# Patient Record
Sex: FEMALE | Race: WHITE | NOT HISPANIC OR LATINO | Employment: UNEMPLOYED | URBAN - METROPOLITAN AREA
[De-identification: names, ages, dates, MRNs, and addresses within clinical notes are randomized per-mention and may not be internally consistent; named-entity substitution may affect disease eponyms.]

---

## 2018-04-12 PROBLEM — R94.30 ABNORMAL CARDIAC FUNCTION TEST: Status: ACTIVE | Noted: 2018-04-12

## 2021-04-16 ENCOUNTER — PATIENT MESSAGE (OUTPATIENT)
Dept: RESEARCH | Facility: HOSPITAL | Age: 61
End: 2021-04-16

## 2021-10-26 ENCOUNTER — TELEPHONE (OUTPATIENT)
Dept: SURGERY | Facility: CLINIC | Age: 61
End: 2021-10-26
Payer: MEDICAID

## 2022-09-12 ENCOUNTER — TELEPHONE (OUTPATIENT)
Dept: SURGERY | Facility: CLINIC | Age: 62
End: 2022-09-12
Payer: MEDICAID

## 2022-09-12 DIAGNOSIS — Z12.11 SCREENING FOR COLON CANCER: Primary | ICD-10-CM

## 2022-09-12 RX ORDER — SODIUM CHLORIDE 0.9 % (FLUSH) 0.9 %
3 SYRINGE (ML) INJECTION
Status: CANCELLED | OUTPATIENT
Start: 2022-09-12

## 2022-09-12 NOTE — TELEPHONE ENCOUNTER
Called patient in reference to a referral to Colorectal Surgery for colon cancer screening. Patient verbally consented to a Colonoscopy and requested to be scheduled for a Colonoscopy on 11/14/2022. Patient was advised a designated  is required on the day of the Colonoscopy to drive the patient home and the  must be at least. 18 years old.Colonoscopy Prep instructions were thoroughly explained and discussed with the patient.   It was emphasized, and reiterated to the patient, not to follow the prep instructions that comes with the Prep Kit. However, to please follow the prep instructions that will be received in the mail from the [facility]   Patient acknowledges understanding Prep instructions as explained and discussed on the phone.. Patient was advised the Colonoscopy Prep instructions discussed and explained on the phone,are being mailed out to the patient's verified address on file. Patient's address on file was verified with the patient for accuracy of mailing. Patient's medications on file was reviewed with the patient for accuracy of information. Patient denies taking  any other medications other than those listed and verified on medication profile.Additionally , this patient denies to still be taking Plavix 75 MG PO daily.Patient was explained the Colonoscopy will be performed here at [facility]. Patient was further explained the Pre-Op will call one day prior to the procedure date, to discuss Pre-Op instructions;and what time to report for the Colonoscopy. The patient was given the opportunity to ask any questions about the Colonoscopy. No further issues were discussed.         Bowel Prep/SUPREP instructions                                               [facility]    [address] [place] [address]      You are scheduled for a Colonoscopy with _______________________ on ____________________   At [facility] in [place].    Check  in at the hospital on 1st floor Registration area next to Emergency room.    Please call [phone] to reschedule or if you have any questions.    An adult friend/family member must come with you to drive you home.  You cannot drive, take a taxi, Uber/Lyft or bus to leave the Hospital alone. If you do not have someone with you to drive you home, your test will be cancelled.       Please follow the directions of your doctor if you take any pills that thin your blood.  If you take these meds: Aggrenox, Brilinta, Effient, Eliquis, Lovenox, Plavix, Pletal Pradaxa ticilid, Xarelto, or Coumadin, let the doctor's office know.    Don't: On the morning of the test do not take insulin or pills for diabetes.   Do: On the morning of the test, do take any pills for blood pressure, heart, anti-rejection and or seizures with a small sip of water. Bring any inhalers with you day of procedure.    To have a good prep, you must follow these instructions- please do not use the directions from the pharmacy!      The doctor will send a prescription for the SUPREP   The day Before the test:   You can only drink CLEAR LIQUIDS the whole day before your test. You can't eat any food for the whole day.    You CAN have:   Water,Coffee or decaf coffee (no milk or cream)    Tea   Soft drinks- regular and sugar free   Jell-O (green or yellow)   Apple Juice, grape juice, white cranberry juice   Gatorade, Power Aid, Crystal Light, Zhou Aid   Lemonade and Limeade   Bouillon, clear soup   Snowball, popsicles   YOU CAN'T DRINK ANYTHING RED   YOU CAN'T DRINK ALCOHOL   ONLY DRINK WHAT IS ON THIS List      At 5pm the night before your test:   Pour the 1st bottle of SUPREP into the cup provided in the box.  Add water to the line on the cup and mix well. Drink the whole cup and then drink 2 more full cups of water over the 1 hour.    This can be easier to drink if it is cold.  You can mix it 20 minutes ahead of time and place in the refrigerator before  you drink it.  You must drink it within 30-45 minutes of mixing it. Do NOT pour the drink over ice. You can drink it with a straw.    The Day of the test- We will call you 1 day before your test to tell you what time to get there.    5 hours before you come to the hospital (this may be the middle of the night)   Pour the 2nd bottle of SUPREP into to the cup provided in the box. Add water to the line on the cup and mix well. Drink the whole cup and then drink 2 more full cups of water over 1 hour.    It might be easier to drink if it is cold. You can mix it 20 minutes ahead of time and place in the refrigerator before you drink it. You must drink it within 30-45 minutes of mixing it. Do NOT pour the drink over ice. You can drink it with a straw.   YOU CAN'T EAT OR DRINK ANYTHING ELSE ONCE YOU FINISH THE PREP.   Leave all valuables and jewelry at home. You will be at the hospital for 2-4 hours.    Call the Endoscopy Scheduling Department at [phone] with any questions about your procedure.    Please  your medication from your local pharmacy. If you are unable to  the SUPREP Kit please contact our office.   Thank you   Endo Scheduling Dept   [facility]

## 2022-09-13 RX ORDER — SODIUM, POTASSIUM,MAG SULFATES 17.5-3.13G
1 SOLUTION, RECONSTITUTED, ORAL ORAL DAILY
Qty: 1 KIT | Refills: 0 | Status: SHIPPED | OUTPATIENT
Start: 2022-09-13 | End: 2022-09-15

## 2022-11-22 ENCOUNTER — TELEPHONE (OUTPATIENT)
Dept: GASTROENTEROLOGY | Facility: CLINIC | Age: 62
End: 2022-11-22
Payer: MEDICAID

## 2023-05-11 NOTE — PROGRESS NOTES
"CRS Office Visit History and Physical    Referring Md:   Francisca Squires, Np  [address]  [address]  [place] [address]    SUBJECTIVE:     Chief Complaint: anal polyp     History of Present Illness:  The patient is a new patient to this practice.   Course is as follows:  Nya De La Paz is a 62 y.o. female presents with a distal rectal polyp found on colonoscopy 11/22.  No pain, no bleeding, no prolapsing tissue.  No family history of colon cancer.     Last Colonoscopy: 11/2022    Review of patient's allergies indicates:  No Known Allergies    Past Medical History:   Diagnosis Date    Abnormal stress test 2018    Angina pectoris     High cholesterol     Hypertension     Hypokalemia 04/12/2018    Type 2 diabetes mellitus without complications      Past Surgical History:   Procedure Laterality Date    APPENDECTOMY      CARDIAC CATHETERIZATION  04/12/2018    v-fib when catheter introduced into right ventricle-defib x1    COLONOSCOPY N/A 11/14/2022    Procedure: COLONOSCOPY;  Surgeon: Mic Oscar MD;  Location: [facility];  Service: Endoscopy;  Laterality: N/A;    HERNIA REPAIR      HYSTERECTOMY      TONSILLECTOMY       Family History   Problem Relation Age of Onset    Breast cancer Mother      Social History     Tobacco Use    Smoking status: Every Day     Packs/day: 1.00     Types: Cigarettes   Substance Use Topics    Alcohol use: Yes     Comment: ocassionally    Drug use: No        Review of Systems:  Review of Systems   All other systems reviewed and are negative.    OBJECTIVE:     Vital Signs (Most Recent)  /68   Pulse 62   Ht 5' 2" (1.575 m)   Wt 71.1 kg (156 lb 10.2 oz)   BMI 28.65 kg/m²     Physical Exam:  General: 62 y.o. female in no distress   Neuro: alert and oriented x 4.  Moves all extremities.     HEENT: normocephalic, atraumatic, PERRL, EOMI   Respiratory: respirations are even and unlabored  Cardiac: regular rate and rhythm  Abdomen: soft, " NTND  Extremities: Warm dry and intact  Skin: no rashes  Anorectal: intact tone, no masses or blood    Anoscopy:   Verbal consent obtained. A lubricated anoscope was inserted and circumferential inspection performed. A small, benign appearing polypoid lesion identified in the anal canal.  The exam was slightly limited by patient discomfort.     Labs: NA    Imaging: NA      ASSESSMENT/PLAN:     There are no diagnoses linked to this encounter.    62 y.o. female with polypoid lesion in distal rectum/anus.     - we discussed that this is a benign appearing lesion  - We discussed potential options for removal including endoscopic vs. Surgical excision.  I offered endoscopic excision in clinic but Ms. De La Paz would prefer sedation due to discomfort of initial exam.   - will plan for follow up to schedule excision at patient's convenience    Nery Khan MD  Staff Surgeon  Colon & Rectal Surgery

## 2023-05-12 ENCOUNTER — OFFICE VISIT (OUTPATIENT)
Dept: SURGERY | Facility: CLINIC | Age: 63
End: 2023-05-12
Payer: MEDICAID

## 2023-05-12 VITALS
WEIGHT: 156.63 LBS | BODY MASS INDEX: 28.82 KG/M2 | HEART RATE: 62 BPM | SYSTOLIC BLOOD PRESSURE: 116 MMHG | DIASTOLIC BLOOD PRESSURE: 68 MMHG | HEIGHT: 62 IN

## 2023-05-12 DIAGNOSIS — K62.0 ANAL POLYP: Primary | ICD-10-CM

## 2023-05-12 PROCEDURE — 3008F BODY MASS INDEX DOCD: CPT | Mod: CPTII,,, | Performed by: SURGERY

## 2023-05-12 PROCEDURE — 99203 PR OFFICE/OUTPT VISIT, NEW, LEVL III, 30-44 MIN: ICD-10-PCS | Mod: S$PBB,,, | Performed by: SURGERY

## 2023-05-12 PROCEDURE — 3078F PR MOST RECENT DIASTOLIC BLOOD PRESSURE < 80 MM HG: ICD-10-PCS | Mod: CPTII,,, | Performed by: SURGERY

## 2023-05-12 PROCEDURE — 99203 OFFICE O/P NEW LOW 30 MIN: CPT | Mod: S$PBB,,, | Performed by: SURGERY

## 2023-05-12 PROCEDURE — 99212 OFFICE O/P EST SF 10 MIN: CPT | Mod: PBBFAC | Performed by: SURGERY

## 2023-05-12 PROCEDURE — 1159F MED LIST DOCD IN RCRD: CPT | Mod: CPTII,,, | Performed by: SURGERY

## 2023-05-12 PROCEDURE — 1159F PR MEDICATION LIST DOCUMENTED IN MEDICAL RECORD: ICD-10-PCS | Mod: CPTII,,, | Performed by: SURGERY

## 2023-05-12 PROCEDURE — 3008F PR BODY MASS INDEX (BMI) DOCUMENTED: ICD-10-PCS | Mod: CPTII,,, | Performed by: SURGERY

## 2023-05-12 PROCEDURE — 3074F SYST BP LT 130 MM HG: CPT | Mod: CPTII,,, | Performed by: SURGERY

## 2023-05-12 PROCEDURE — 3078F DIAST BP <80 MM HG: CPT | Mod: CPTII,,, | Performed by: SURGERY

## 2023-05-12 PROCEDURE — 99999 PR PBB SHADOW E&M-EST. PATIENT-LVL II: ICD-10-PCS | Mod: PBBFAC,,, | Performed by: SURGERY

## 2023-05-12 PROCEDURE — 99999 PR PBB SHADOW E&M-EST. PATIENT-LVL II: CPT | Mod: PBBFAC,,, | Performed by: SURGERY

## 2023-05-12 PROCEDURE — 3074F PR MOST RECENT SYSTOLIC BLOOD PRESSURE < 130 MM HG: ICD-10-PCS | Mod: CPTII,,, | Performed by: SURGERY

## 2023-05-15 ENCOUNTER — TELEPHONE (OUTPATIENT)
Dept: SURGERY | Facility: CLINIC | Age: 63
End: 2023-05-15
Payer: MEDICAID

## 2023-05-15 PROBLEM — K62.0 ANAL POLYP: Status: ACTIVE | Noted: 2023-05-15

## 2023-05-15 NOTE — TELEPHONE ENCOUNTER
Spoke with pt regarding request to schedule procedure. Offered pt 5/29 to return to clinic to discuss details and sign consents. Pt verbally confirmed to date, time, and location. Denies further questions.       ----- Message from Matheus Tian MA sent at 5/15/2023  9:00 AM CDT -----  Contact: [phone]  Pt states she wants the polyp to be taken out sooner than the 6 mths, and she would like to be put to sleep. Please reach out to pt at [phone]

## 2023-05-23 ENCOUNTER — TELEPHONE (OUTPATIENT)
Dept: HEMATOLOGY/ONCOLOGY | Facility: CLINIC | Age: 63
End: 2023-05-23

## 2023-05-23 NOTE — TELEPHONE ENCOUNTER
----- Message from Ina Sanders sent at 5/23/2023  9:02 AM CDT -----  The patient called about scheduling a new patient appointment.

## 2023-05-26 ENCOUNTER — TELEPHONE (OUTPATIENT)
Dept: SURGERY | Facility: CLINIC | Age: 63
End: 2023-05-26
Payer: MEDICAID

## 2023-05-29 ENCOUNTER — OFFICE VISIT (OUTPATIENT)
Dept: SURGERY | Facility: CLINIC | Age: 63
End: 2023-05-29
Payer: MEDICAID

## 2023-05-29 VITALS
DIASTOLIC BLOOD PRESSURE: 70 MMHG | WEIGHT: 153.81 LBS | SYSTOLIC BLOOD PRESSURE: 135 MMHG | HEART RATE: 68 BPM | BODY MASS INDEX: 28.13 KG/M2

## 2023-05-29 DIAGNOSIS — K62.0 ANAL POLYP: Primary | ICD-10-CM

## 2023-05-29 PROCEDURE — 3075F SYST BP GE 130 - 139MM HG: CPT | Mod: CPTII,,, | Performed by: SURGERY

## 2023-05-29 PROCEDURE — 3078F DIAST BP <80 MM HG: CPT | Mod: CPTII,,, | Performed by: SURGERY

## 2023-05-29 PROCEDURE — 1159F PR MEDICATION LIST DOCUMENTED IN MEDICAL RECORD: ICD-10-PCS | Mod: CPTII,,, | Performed by: SURGERY

## 2023-05-29 PROCEDURE — 3008F PR BODY MASS INDEX (BMI) DOCUMENTED: ICD-10-PCS | Mod: CPTII,,, | Performed by: SURGERY

## 2023-05-29 PROCEDURE — 3075F PR MOST RECENT SYSTOLIC BLOOD PRESS GE 130-139MM HG: ICD-10-PCS | Mod: CPTII,,, | Performed by: SURGERY

## 2023-05-29 PROCEDURE — 3008F BODY MASS INDEX DOCD: CPT | Mod: CPTII,,, | Performed by: SURGERY

## 2023-05-29 PROCEDURE — 99999 PR PBB SHADOW E&M-EST. PATIENT-LVL II: CPT | Mod: PBBFAC,,, | Performed by: SURGERY

## 2023-05-29 PROCEDURE — 1159F MED LIST DOCD IN RCRD: CPT | Mod: CPTII,,, | Performed by: SURGERY

## 2023-05-29 PROCEDURE — 99999 PR PBB SHADOW E&M-EST. PATIENT-LVL II: ICD-10-PCS | Mod: PBBFAC,,, | Performed by: SURGERY

## 2023-05-29 PROCEDURE — 99212 OFFICE O/P EST SF 10 MIN: CPT | Mod: S$PBB,,, | Performed by: SURGERY

## 2023-05-29 PROCEDURE — 3078F PR MOST RECENT DIASTOLIC BLOOD PRESSURE < 80 MM HG: ICD-10-PCS | Mod: CPTII,,, | Performed by: SURGERY

## 2023-05-29 PROCEDURE — 99212 OFFICE O/P EST SF 10 MIN: CPT | Mod: PBBFAC | Performed by: SURGERY

## 2023-05-29 PROCEDURE — 99212 PR OFFICE/OUTPT VISIT, EST, LEVL II, 10-19 MIN: ICD-10-PCS | Mod: S$PBB,,, | Performed by: SURGERY

## 2023-05-29 NOTE — H&P (VIEW-ONLY)
Colon & Rectal Surgery Clinic Follow Up    HPI:   Nya De La Paz is a 62 y.o. female who presents for follow up of anal lesion.  Changed her mind shortly after last visit and wants to do excision in endoscopy.  No other changes since last visit      Objective:   Vitals:    05/29/23 1440   BP: 135/70   Pulse: 68        Physical Exam   Gen: well developed female, NAD  HEENT: normocephalic, atraumatic, PERRL, EOMI   CV: RRR, no murmurs  Resp: nonlabored, CTAB   Abd: soft, NTND  MSK: no gross deformities     Assessment and Plan:   Nya De La Paz  is a 62 y.o. female who presents for follow up of anal polyp    - plan for excision in endoscopy on 6/15  - 2 enemas prior to procedure  - consents day of       Nery Khan MD  Staff Surgeon   Colon & Rectal Surgery

## 2023-06-14 ENCOUNTER — TELEPHONE (OUTPATIENT)
Dept: SURGERY | Facility: CLINIC | Age: 63
End: 2023-06-14
Payer: MEDICAID

## 2023-06-14 NOTE — TELEPHONE ENCOUNTER
----- Message from Leni Manuel sent at 6/14/2023  2:48 PM CDT -----  Regarding: Pt Advice / Procedure  Contact:  [phone]  Pt called regarding procedure 6/15/23, need arrival time and any other instructions prior to procedure. Please Call

## 2023-06-15 ENCOUNTER — ANESTHESIA (OUTPATIENT)
Dept: ENDOSCOPY | Facility: OTHER | Age: 63
End: 2023-06-15
Payer: MEDICAID

## 2023-06-15 ENCOUNTER — HOSPITAL ENCOUNTER (OUTPATIENT)
Facility: OTHER | Age: 63
Discharge: HOME | End: 2023-06-15
Attending: SURGERY | Admitting: SURGERY
Payer: MEDICAID

## 2023-06-15 ENCOUNTER — ANESTHESIA EVENT (OUTPATIENT)
Dept: ENDOSCOPY | Facility: OTHER | Age: 63
End: 2023-06-15
Payer: MEDICAID

## 2023-06-15 VITALS
HEIGHT: 62 IN | OXYGEN SATURATION: 96 % | BODY MASS INDEX: 28.32 KG/M2 | WEIGHT: 153.88 LBS | SYSTOLIC BLOOD PRESSURE: 138 MMHG | TEMPERATURE: 98 F | HEART RATE: 67 BPM | RESPIRATION RATE: 16 BRPM | DIASTOLIC BLOOD PRESSURE: 75 MMHG

## 2023-06-15 DIAGNOSIS — K62.0 ANAL POLYP: Primary | ICD-10-CM

## 2023-06-15 DIAGNOSIS — Z12.11 ENCOUNTER FOR SCREENING COLONOSCOPY: ICD-10-CM

## 2023-06-15 LAB — POCT GLUCOSE: 107 MG/DL (ref 70–110)

## 2023-06-15 PROCEDURE — 82962 GLUCOSE BLOOD TEST: CPT | Performed by: SURGERY

## 2023-06-15 PROCEDURE — 45331 SIGMOIDOSCOPY AND BIOPSY: CPT | Mod: 74 | Performed by: SURGERY

## 2023-06-15 PROCEDURE — 00811 ANES LWR INTST NDSC NOS: CPT | Performed by: SURGERY

## 2023-06-15 PROCEDURE — 88305 TISSUE EXAM BY PATHOLOGIST: CPT | Mod: 26,,, | Performed by: PATHOLOGY

## 2023-06-15 PROCEDURE — 45331 SIGMOIDOSCOPY AND BIOPSY: CPT | Mod: 52,,, | Performed by: SURGERY

## 2023-06-15 PROCEDURE — 25000003 PHARM REV CODE 250: Performed by: NURSE ANESTHETIST, CERTIFIED REGISTERED

## 2023-06-15 PROCEDURE — 45331 PR SIGMOIDOSCOPY,BIOPSY: ICD-10-PCS | Mod: 52,,, | Performed by: SURGERY

## 2023-06-15 PROCEDURE — 37000009 HC ANESTHESIA EA ADD 15 MINS: Performed by: SURGERY

## 2023-06-15 PROCEDURE — 63600175 PHARM REV CODE 636 W HCPCS: Performed by: NURSE ANESTHETIST, CERTIFIED REGISTERED

## 2023-06-15 PROCEDURE — 63600175 PHARM REV CODE 636 W HCPCS: Performed by: ANESTHESIOLOGY

## 2023-06-15 PROCEDURE — D9220A PRA ANESTHESIA: Mod: ,,, | Performed by: NURSE ANESTHETIST, CERTIFIED REGISTERED

## 2023-06-15 PROCEDURE — D9220A PRA ANESTHESIA: ICD-10-PCS | Mod: ,,, | Performed by: NURSE ANESTHETIST, CERTIFIED REGISTERED

## 2023-06-15 PROCEDURE — 88305 TISSUE EXAM BY PATHOLOGIST: CPT | Performed by: PATHOLOGY

## 2023-06-15 PROCEDURE — 37000008 HC ANESTHESIA 1ST 15 MINUTES: Performed by: SURGERY

## 2023-06-15 PROCEDURE — 88305 TISSUE EXAM BY PATHOLOGIST: ICD-10-PCS | Mod: 26,,, | Performed by: PATHOLOGY

## 2023-06-15 RX ORDER — OXYCODONE HYDROCHLORIDE 5 MG/1
5 TABLET ORAL
Status: CANCELLED | OUTPATIENT
Start: 2023-06-15

## 2023-06-15 RX ORDER — DIPHENHYDRAMINE HYDROCHLORIDE 50 MG/ML
25 INJECTION INTRAMUSCULAR; INTRAVENOUS EVERY 6 HOURS PRN
Status: CANCELLED | OUTPATIENT
Start: 2023-06-15

## 2023-06-15 RX ORDER — PROPOFOL 10 MG/ML
VIAL (ML) INTRAVENOUS CONTINUOUS PRN
Status: DISCONTINUED | OUTPATIENT
Start: 2023-06-15 | End: 2023-06-15

## 2023-06-15 RX ORDER — PROCHLORPERAZINE EDISYLATE 5 MG/ML
5 INJECTION INTRAMUSCULAR; INTRAVENOUS EVERY 30 MIN PRN
Status: CANCELLED | OUTPATIENT
Start: 2023-06-15

## 2023-06-15 RX ORDER — HYDROMORPHONE HYDROCHLORIDE 2 MG/ML
0.4 INJECTION, SOLUTION INTRAMUSCULAR; INTRAVENOUS; SUBCUTANEOUS EVERY 5 MIN PRN
Status: CANCELLED | OUTPATIENT
Start: 2023-06-15

## 2023-06-15 RX ORDER — LIDOCAINE HYDROCHLORIDE 20 MG/ML
INJECTION INTRAVENOUS
Status: DISCONTINUED | OUTPATIENT
Start: 2023-06-15 | End: 2023-06-15

## 2023-06-15 RX ORDER — PHENYLEPHRINE HYDROCHLORIDE 10 MG/ML
INJECTION INTRAVENOUS
Status: DISCONTINUED | OUTPATIENT
Start: 2023-06-15 | End: 2023-06-15

## 2023-06-15 RX ORDER — SODIUM CHLORIDE 0.9 % (FLUSH) 0.9 %
3 SYRINGE (ML) INJECTION
Status: DISCONTINUED | OUTPATIENT
Start: 2023-06-15 | End: 2023-06-15 | Stop reason: HOSPADM

## 2023-06-15 RX ORDER — MEPERIDINE HYDROCHLORIDE 25 MG/ML
12.5 INJECTION INTRAMUSCULAR; INTRAVENOUS; SUBCUTANEOUS ONCE AS NEEDED
Status: CANCELLED | OUTPATIENT
Start: 2023-06-15 | End: 2023-06-16

## 2023-06-15 RX ADMIN — PHENYLEPHRINE HYDROCHLORIDE 300 MCG: 10 INJECTION INTRAVENOUS at 01:06

## 2023-06-15 RX ADMIN — SODIUM CHLORIDE, SODIUM LACTATE, POTASSIUM CHLORIDE, AND CALCIUM CHLORIDE: .6; .31; .03; .02 INJECTION, SOLUTION INTRAVENOUS at 12:06

## 2023-06-15 RX ADMIN — GLYCOPYRROLATE 0.2 MG: 0.2 INJECTION, SOLUTION INTRAMUSCULAR; INTRAVITREAL at 01:06

## 2023-06-15 RX ADMIN — LIDOCAINE HYDROCHLORIDE 100 MG: 20 INJECTION, SOLUTION INTRAVENOUS at 01:06

## 2023-06-15 RX ADMIN — PROPOFOL 150 MCG/KG/MIN: 10 INJECTION, EMULSION INTRAVENOUS at 01:06

## 2023-06-15 NOTE — PLAN OF CARE
Nya De La Paz has met all discharge criteria from Phase II. Vital Signs are stable, ambulating  without difficulty. Discharge instructions given, patient verbalized understanding. Discharged from facility via wheelchair in stable condition.

## 2023-06-15 NOTE — ANESTHESIA PREPROCEDURE EVALUATION
06/15/2023  Nya De La Paz is a 62 y.o., female.      Pre-op Assessment    I have reviewed the Patient Summary Reports.     I have reviewed the Nursing Notes.    I have reviewed the Medications.     Review of Systems  Anesthesia Hx:  Denies Family Hx of Anesthesia complications.   Denies Personal Hx of Anesthesia complications.   Social:  Non-Smoker    Hematology/Oncology:  Hematology Normal   Oncology Normal     EENT/Dental:EENT/Dental Normal   Cardiovascular:   Hypertension Angina    Pulmonary:  Pulmonary Normal    Renal/:  Renal/ Normal     Hepatic/GI:  Hepatic/GI Normal    Musculoskeletal:  Musculoskeletal Normal    Neurological:  Neurology Normal    Endocrine:   Diabetes    Dermatological:  Skin Normal    Psych:  Psychiatric Normal           Physical Exam  General: Well nourished, Cooperative, Alert and Oriented    Airway:  Mallampati: III   Mouth Opening: Normal  TM Distance: Normal  Tongue: Normal  Neck ROM: Normal ROM    Dental:  Intact        Anesthesia Plan  Type of Anesthesia, risks & benefits discussed:    Anesthesia Type: Gen Natural Airway  Intra-op Monitoring Plan: Standard ASA Monitors  Post Op Pain Control Plan: multimodal analgesia  Induction:  IV  Informed Consent: Informed consent signed with the Patient and all parties understand the risks and agree with anesthesia plan.  All questions answered.   ASA Score: 3    Ready For Surgery From Anesthesia Perspective.     .

## 2023-06-15 NOTE — DISCHARGE SUMMARY
[facility] - Endoscopy  Discharge Note  Short Stay    Procedure(s) (LRB):  SIGMOIDOSCOPY, FLEXIBLE (N/A)      OUTCOME: Patient tolerated treatment/procedure well without complication and is now ready for discharge.    DISPOSITION: Home or Self Care    FINAL DIAGNOSIS:  anal polyp    FOLLOWUP: In clinic    DISCHARGE INSTRUCTIONS:   - resume diet and activity as tolerated  - some blood per rectum is expected after this procedure and may last up to a week  - 3 year interval colonoscopy based on prior colonoscopy    Nery Khan MD  Staff Surgeon   Colon & Rectal Surgery

## 2023-06-15 NOTE — [employer] PATIENT INSTRUCTIONS
Discharge Summary/Instructions after an Endoscopic Procedure  Patient Name: Nya De La Paz  Patient MRN: [id number]  Patient YOB: 1960  Thursday, Sydnie 15, 2023  Nery Khan MD  RESTRICTIONS:  During your procedure today, you received medications for sedation.  These   medications may affect your judgment, balance and coordination.  Therefore,   for 24 hours, you have the following restrictions:   - DO NOT drive a car, operate machinery, make legal/financial decisions,   sign important papers or drink alcohol.    ACTIVITY:  Today: no heavy lifting, straining or running due to procedural   sedation/anesthesia.  The following day: return to full activity including work.  DIET:  Eat and drink normally unless instructed otherwise.     TREATMENT FOR COMMON SIDE EFFECTS:  - Mild abdominal pain, nausea, belching, bloating or excessive gas:  rest,   eat lightly and use a heating pad.  - Sore Throat: treat with throat lozenges and/or gargle with warm salt   water.  - Because air was used during the procedure, expelling large amounts of air   from your rectum or belching is normal.  - If a bowel prep was taken, you may not have a bowel movement for 1-3 days.    This is normal.  SYMPTOMS TO WATCH FOR AND REPORT TO YOUR PHYSICIAN:  1. Abdominal pain or bloating, other than gas cramps.  2. Chest pain.  3. Back pain.  4. Signs of infection such as: chills or fever occurring within 24 hours   after the procedure.  5. Rectal bleeding, which would show as bright red, maroon, or black stools.   (A tablespoon of blood from the rectum is not serious, especially if   hemorrhoids are present.)  6. Vomiting.  7. Weakness or dizziness.  GO DIRECTLY TO THE NEAREST EMERGENCY ROOM IF YOU HAVE ANY OF THE FOLLOWING:      Difficulty breathing              Chills and/or fever over 101 F   Persistent vomiting and/or vomiting blood   Severe abdominal pain   Severe chest pain   Black, tarry stools   Bleeding- more than one tablespoon   Any  other symptom or condition that you feel may need urgent attention  Your doctor recommends these additional instructions:  If any biopsies were taken, your doctors clinic will contact you in 1 to 2   weeks with any results.  - Discharge patient to home (ambulatory).   - Patient has a contact number available for emergencies.  The signs and   symptoms of potential delayed complications were discussed with the   patient.  Return to normal activities tomorrow.  Written discharge   instructions were provided to the patient.  For questions, problems or results please call your physician - Nery Khan MD at Work:  [phone].  [facility], EMERGENCY ROOM PHONE NUMBER: [phone], [place]   [phone].  IF A COMPLICATION OR EMERGENCY SITUATION ARISES AND YOU ARE UNABLE TO REACH   YOUR PHYSICIAN - GO DIRECTLY TO THE EMERGENCY ROOM.  MD Nery Brooks MD  6/15/2023 1:53:29 PM  This report has been verified and signed electronically.  Dear patient,  As a result of recent federal legislation (The Federal Cures Act), you may   receive lab or pathology results from your procedure in your [facility]   account before your physician is able to contact you. Your physician or   their representative will relay the results to you with their   recommendations at their soonest availability.  Thank you,  [employer]

## 2023-06-15 NOTE — ANESTHESIA POSTPROCEDURE EVALUATION
Anesthesia Post Evaluation    Patient: Nya De La Paz    Procedure(s) Performed: Procedure(s) (LRB):  SIGMOIDOSCOPY, FLEXIBLE (N/A)    Final Anesthesia Type: general      Patient location during evaluation: [facility]  Patient participation: Yes- Able to Participate  Level of consciousness: awake and alert  Post-procedure vital signs: reviewed and stable  Pain management: adequate  Airway patency: patent    PONV status at discharge: No PONV  Anesthetic complications: no      Cardiovascular status: blood pressure returned to baseline  Respiratory status: unassisted, room air and spontaneous ventilation  Hydration status: euvolemic  Follow-up not needed.          Vitals Value Taken Time   /77 06/15/23 1135   Temp 36.7 °C (98 °F) 06/15/23 1135   Pulse 62 06/15/23 1135   Resp 18 06/15/23 1135   SpO2 96 % 06/15/23 1135         No case tracking events are documented in the log.      Pain/Anabelle Score: No data recorded

## 2023-06-20 LAB
FINAL PATHOLOGIC DIAGNOSIS: NORMAL
GROSS: NORMAL
Lab: NORMAL

## 2025-05-03 NOTE — TELEPHONE ENCOUNTER
The patient has been advised the Colonoscopy Prep Kit will be ordered from the patient's verified preferred pharmacy on file. The medication can  be picked up by the patient, or the patient's designated representative. The patient was further explained; Colonoscopy Prep instructions will be mailed to the patient verified address on file. The patient has been advised to follow the Colonoscopy Prep instructions being mailed;as precise meticulously as possible. Patient was advise you  will receive a follow up phone call; to summarize the Colonoscopy Prep instructions, prior to the scheduled Colonoscopy procedure date. At this time the patient will be given an opportunity to ask any questions regarding the Colonoscopy Prep and associated Colonoscopy procedure. procedure.    patient

## 2025-07-28 DIAGNOSIS — Z12.31 ENCOUNTER FOR SCREENING MAMMOGRAM FOR MALIGNANT NEOPLASM OF BREAST: Primary | ICD-10-CM

## (undated) DEVICE — Device